# Patient Record
Sex: MALE | Race: WHITE | Employment: FULL TIME | URBAN - METROPOLITAN AREA
[De-identification: names, ages, dates, MRNs, and addresses within clinical notes are randomized per-mention and may not be internally consistent; named-entity substitution may affect disease eponyms.]

---

## 2017-02-04 ENCOUNTER — HOSPITAL ENCOUNTER (OUTPATIENT)
Facility: HOSPITAL | Age: 57
Setting detail: OBSERVATION
Discharge: HOME | End: 2017-02-05
Attending: EMERGENCY MEDICINE | Admitting: HOSPITALIST
Payer: COMMERCIAL

## 2017-02-04 ENCOUNTER — APPOINTMENT (OUTPATIENT)
Dept: CV DIAGNOSTICS | Facility: HOSPITAL | Age: 57
End: 2017-02-04
Attending: INTERNAL MEDICINE
Payer: COMMERCIAL

## 2017-02-04 ENCOUNTER — APPOINTMENT (OUTPATIENT)
Dept: INTERVENTIONAL RADIOLOGY/VASCULAR | Facility: HOSPITAL | Age: 57
End: 2017-02-04
Attending: INTERNAL MEDICINE
Payer: COMMERCIAL

## 2017-02-04 ENCOUNTER — APPOINTMENT (OUTPATIENT)
Dept: GENERAL RADIOLOGY | Facility: HOSPITAL | Age: 57
End: 2017-02-04
Attending: EMERGENCY MEDICINE
Payer: COMMERCIAL

## 2017-02-04 ENCOUNTER — APPOINTMENT (OUTPATIENT)
Dept: CT IMAGING | Facility: HOSPITAL | Age: 57
End: 2017-02-04
Attending: INTERNAL MEDICINE
Payer: COMMERCIAL

## 2017-02-04 DIAGNOSIS — I10 ESSENTIAL HYPERTENSION: ICD-10-CM

## 2017-02-04 DIAGNOSIS — R07.9 ACUTE CHEST PAIN: Primary | ICD-10-CM

## 2017-02-04 LAB
ALBUMIN SERPL-MCNC: 4.3 G/DL (ref 3.5–4.8)
ALP LIVER SERPL-CCNC: 52 U/L (ref 45–117)
ALT SERPL-CCNC: 27 U/L (ref 17–63)
APTT PPP: 29.4 SECONDS (ref 25–34)
AST SERPL-CCNC: 23 U/L (ref 15–41)
ATRIAL RATE: 76 BPM
ATRIAL RATE: 85 BPM
BASOPHILS # BLD AUTO: 0.06 X10(3) UL (ref 0–0.1)
BASOPHILS NFR BLD AUTO: 0.9 %
BILIRUB SERPL-MCNC: 1 MG/DL (ref 0.1–2)
BUN BLD-MCNC: 14 MG/DL (ref 8–20)
CALCIUM BLD-MCNC: 9 MG/DL (ref 8.3–10.3)
CHLORIDE: 106 MMOL/L (ref 101–111)
CO2: 31 MMOL/L (ref 22–32)
CREAT BLD-MCNC: 1.17 MG/DL (ref 0.7–1.3)
EOSINOPHIL # BLD AUTO: 0.13 X10(3) UL (ref 0–0.3)
EOSINOPHIL NFR BLD AUTO: 2 %
ERYTHROCYTE [DISTWIDTH] IN BLOOD BY AUTOMATED COUNT: 12.5 % (ref 11.5–16)
GLUCOSE BLD-MCNC: 117 MG/DL (ref 70–99)
HCT VFR BLD AUTO: 48 % (ref 37–53)
HGB BLD-MCNC: 16.9 G/DL (ref 13–17)
IMMATURE GRANULOCYTE COUNT: 0.03 X10(3) UL (ref 0–1)
IMMATURE GRANULOCYTE RATIO %: 0.5 %
LYMPHOCYTES # BLD AUTO: 2.09 X10(3) UL (ref 0.9–4)
LYMPHOCYTES NFR BLD AUTO: 32.4 %
M PROTEIN MFR SERPL ELPH: 7.4 G/DL (ref 6.1–8.3)
MCH RBC QN AUTO: 30.9 PG (ref 27–33.2)
MCHC RBC AUTO-ENTMCNC: 35.2 G/DL (ref 31–37)
MCV RBC AUTO: 87.8 FL (ref 80–99)
MONOCYTES # BLD AUTO: 0.44 X10(3) UL (ref 0.1–0.6)
MONOCYTES NFR BLD AUTO: 6.8 %
NEUTROPHIL ABS PRELIM: 3.7 X10 (3) UL (ref 1.3–6.7)
NEUTROPHILS # BLD AUTO: 3.7 X10(3) UL (ref 1.3–6.7)
NEUTROPHILS NFR BLD AUTO: 57.4 %
P AXIS: 53 DEGREES
P AXIS: 65 DEGREES
P-R INTERVAL: 160 MS
P-R INTERVAL: 164 MS
PLATELET # BLD AUTO: 273 10(3)UL (ref 150–450)
POTASSIUM SERPL-SCNC: 4 MMOL/L (ref 3.6–5.1)
Q-T INTERVAL: 352 MS
Q-T INTERVAL: 358 MS
QRS DURATION: 88 MS
QRS DURATION: 90 MS
QTC CALCULATION (BEZET): 402 MS
QTC CALCULATION (BEZET): 418 MS
R AXIS: 17 DEGREES
R AXIS: 24 DEGREES
RBC # BLD AUTO: 5.47 X10(6)UL (ref 4.3–5.7)
RED CELL DISTRIBUTION WIDTH-SD: 39.9 FL (ref 35.1–46.3)
SODIUM SERPL-SCNC: 142 MMOL/L (ref 136–144)
T AXIS: 22 DEGREES
T AXIS: 53 DEGREES
TROPONIN: <0.046 NG/ML (ref ?–0.05)
VENTRICULAR RATE: 76 BPM
VENTRICULAR RATE: 85 BPM
WBC # BLD AUTO: 6.5 X10(3) UL (ref 4–13)

## 2017-02-04 PROCEDURE — 4A023N7 MEASUREMENT OF CARDIAC SAMPLING AND PRESSURE, LEFT HEART, PERCUTANEOUS APPROACH: ICD-10-PCS | Performed by: INTERNAL MEDICINE

## 2017-02-04 PROCEDURE — 93010 ELECTROCARDIOGRAM REPORT: CPT

## 2017-02-04 PROCEDURE — 99153 MOD SED SAME PHYS/QHP EA: CPT

## 2017-02-04 PROCEDURE — 96375 TX/PRO/DX INJ NEW DRUG ADDON: CPT

## 2017-02-04 PROCEDURE — 71275 CT ANGIOGRAPHY CHEST: CPT

## 2017-02-04 PROCEDURE — 99291 CRITICAL CARE FIRST HOUR: CPT

## 2017-02-04 PROCEDURE — 84484 ASSAY OF TROPONIN QUANT: CPT | Performed by: EMERGENCY MEDICINE

## 2017-02-04 PROCEDURE — 99285 EMERGENCY DEPT VISIT HI MDM: CPT

## 2017-02-04 PROCEDURE — 84484 ASSAY OF TROPONIN QUANT: CPT | Performed by: INTERNAL MEDICINE

## 2017-02-04 PROCEDURE — 99152 MOD SED SAME PHYS/QHP 5/>YRS: CPT

## 2017-02-04 PROCEDURE — 93005 ELECTROCARDIOGRAM TRACING: CPT

## 2017-02-04 PROCEDURE — 71010 XR CHEST AP PORTABLE  (CPT=71010): CPT

## 2017-02-04 PROCEDURE — 85025 COMPLETE CBC W/AUTO DIFF WBC: CPT | Performed by: EMERGENCY MEDICINE

## 2017-02-04 PROCEDURE — 93458 L HRT ARTERY/VENTRICLE ANGIO: CPT

## 2017-02-04 PROCEDURE — 96368 THER/DIAG CONCURRENT INF: CPT

## 2017-02-04 PROCEDURE — 80053 COMPREHEN METABOLIC PANEL: CPT | Performed by: EMERGENCY MEDICINE

## 2017-02-04 PROCEDURE — 85730 THROMBOPLASTIN TIME PARTIAL: CPT | Performed by: EMERGENCY MEDICINE

## 2017-02-04 PROCEDURE — 96365 THER/PROPH/DIAG IV INF INIT: CPT

## 2017-02-04 PROCEDURE — B2111ZZ FLUOROSCOPY OF MULTIPLE CORONARY ARTERIES USING LOW OSMOLAR CONTRAST: ICD-10-PCS | Performed by: INTERNAL MEDICINE

## 2017-02-04 PROCEDURE — B3101ZZ FLUOROSCOPY OF THORACIC AORTA USING LOW OSMOLAR CONTRAST: ICD-10-PCS | Performed by: INTERNAL MEDICINE

## 2017-02-04 PROCEDURE — 93567 NJX CAR CTH SPRVLV AORTGRPHY: CPT

## 2017-02-04 RX ORDER — NITROGLYCERIN 0.4 MG/1
0.4 TABLET SUBLINGUAL EVERY 5 MIN PRN
Status: DISCONTINUED | OUTPATIENT
Start: 2017-02-04 | End: 2017-02-05

## 2017-02-04 RX ORDER — ASPIRIN 81 MG/1
324 TABLET, CHEWABLE ORAL ONCE
Status: DISCONTINUED | OUTPATIENT
Start: 2017-02-04 | End: 2017-02-05

## 2017-02-04 RX ORDER — MAGNESIUM HYDROXIDE/ALUMINUM HYDROXICE/SIMETHICONE 120; 1200; 1200 MG/30ML; MG/30ML; MG/30ML
30 SUSPENSION ORAL ONCE
Status: COMPLETED | OUTPATIENT
Start: 2017-02-04 | End: 2017-02-04

## 2017-02-04 RX ORDER — HEPARIN SODIUM AND DEXTROSE 10000; 5 [USP'U]/100ML; G/100ML
1000 INJECTION INTRAVENOUS ONCE
Status: COMPLETED | OUTPATIENT
Start: 2017-02-04 | End: 2017-02-04

## 2017-02-04 RX ORDER — HEPARIN SODIUM AND DEXTROSE 10000; 5 [USP'U]/100ML; G/100ML
INJECTION INTRAVENOUS CONTINUOUS
Status: DISCONTINUED | OUTPATIENT
Start: 2017-02-04 | End: 2017-02-04

## 2017-02-04 RX ORDER — LIDOCAINE HYDROCHLORIDE 10 MG/ML
INJECTION, SOLUTION INFILTRATION; PERINEURAL
Status: COMPLETED
Start: 2017-02-04 | End: 2017-02-04

## 2017-02-04 RX ORDER — FAMOTIDINE 20 MG/1
20 TABLET ORAL 2 TIMES DAILY
Status: DISCONTINUED | OUTPATIENT
Start: 2017-02-04 | End: 2017-02-05

## 2017-02-04 RX ORDER — ASPIRIN 81 MG/1
324 TABLET, CHEWABLE ORAL DAILY
Status: DISCONTINUED | OUTPATIENT
Start: 2017-02-04 | End: 2017-02-05

## 2017-02-04 RX ORDER — LISINOPRIL 10 MG/1
10 TABLET ORAL DAILY
Status: DISCONTINUED | OUTPATIENT
Start: 2017-02-04 | End: 2017-02-05

## 2017-02-04 RX ORDER — MIDAZOLAM HYDROCHLORIDE 1 MG/ML
INJECTION INTRAMUSCULAR; INTRAVENOUS
Status: DISCONTINUED
Start: 2017-02-04 | End: 2017-02-04 | Stop reason: WASHOUT

## 2017-02-04 RX ORDER — NITROGLYCERIN 20 MG/100ML
10 INJECTION INTRAVENOUS CONTINUOUS
Status: DISCONTINUED | OUTPATIENT
Start: 2017-02-04 | End: 2017-02-04

## 2017-02-04 RX ORDER — SODIUM CHLORIDE 9 MG/ML
INJECTION, SOLUTION INTRAVENOUS CONTINUOUS
Status: DISCONTINUED | OUTPATIENT
Start: 2017-02-04 | End: 2017-02-05

## 2017-02-04 RX ORDER — ATORVASTATIN CALCIUM 10 MG/1
10 TABLET, FILM COATED ORAL NIGHTLY
Status: DISCONTINUED | OUTPATIENT
Start: 2017-02-04 | End: 2017-02-05

## 2017-02-04 RX ORDER — MIDAZOLAM HYDROCHLORIDE 1 MG/ML
INJECTION INTRAMUSCULAR; INTRAVENOUS
Status: COMPLETED
Start: 2017-02-04 | End: 2017-02-04

## 2017-02-04 RX ORDER — SODIUM CHLORIDE 9 MG/ML
INJECTION, SOLUTION INTRAVENOUS CONTINUOUS
Status: ACTIVE | OUTPATIENT
Start: 2017-02-04 | End: 2017-02-04

## 2017-02-04 RX ORDER — HEPARIN SODIUM 5000 [USP'U]/ML
5000 INJECTION INTRAVENOUS; SUBCUTANEOUS ONCE
Status: COMPLETED | OUTPATIENT
Start: 2017-02-04 | End: 2017-02-04

## 2017-02-04 RX ORDER — ASPIRIN 325 MG
325 TABLET, DELAYED RELEASE (ENTERIC COATED) ORAL DAILY
Status: DISCONTINUED | OUTPATIENT
Start: 2017-02-05 | End: 2017-02-05

## 2017-02-04 RX ORDER — HEPARIN SODIUM 5000 [USP'U]/ML
INJECTION, SOLUTION INTRAVENOUS; SUBCUTANEOUS
Status: COMPLETED
Start: 2017-02-04 | End: 2017-02-04

## 2017-02-04 NOTE — ED INITIAL ASSESSMENT (HPI)
Pt aox4. Pt presents to ed from home. Pt c/o left chest pain started at 0630 this am. Pt states was lying in bed & felt burning to left chest. Pt states pain changed when rolled over in bed. Pt c/o heart burn.  Pt denies denise

## 2017-02-04 NOTE — PROCEDURES
[facility]  Angiogram Procedure Note    Laila Becerril Location: Cath Lab    [facility] [id number] MRN [id number]   Admission Date 2/4/2017 Procedure Date 2/4/2017   Attending Physician Mohan Arias MD Procedure Physician Amira Tran MD     Pre-Procedure D chest pain and HTN in the ER, we elected to do an aortogram when the coronaries showed no etiology for the chest pain. 40cc of contrast was injected revealing an aorta that was normal in caliber without dissection or aneurysm.   It tapered normally below t

## 2017-02-04 NOTE — CONSULTS
[facility] Cardiology Consultation    Jaquelin Ledbetter Patient Status:  Emergency    1960 MRN [id number]   Location [address] [facility] Attending Stephane Zavala MD   Hosp Day # 0 PCP Jacinto Stevenson MD     Reason for Consultation:  Unstable an famoTIDine  20 mg Oral BID   • aspirin  324 mg Oral Daily   • ED Initial dose Heparin infusion  1,000 Units/hr Intravenous Once   • lisinopril  10 mg Oral Daily       Continuous Infusions:  • Nitroglycerin in D5W         Social History:   reports that he h angiogram this weekend likely pending course.     Kristen Michelle  2/4/2017  9:34 AM

## 2017-02-04 NOTE — H&P
[facility] hospitalist H+P    PCP; Vicente Quintana MD  CC; pain    HPI 50 yo male presented with left sided chest pain, burning, not radiating, was up to 5/10. He felt slightly lightheaded upon standing up today.   Worse when laying on left side, improved with lay Comment: 2-3 per month    Drug Use: No    Sexual Activity: Yes    Comment:      Other Topics Concern   None on file     Social History Narrative     All:No Known Allergies  Meds;      No current facility-administered medications on file prior to e

## 2017-02-04 NOTE — ED NOTES
Confirmed Heparin bolus and drip dosage with Dr. Sushila Wilkinson. MD confirmed both doses are correct.

## 2017-02-04 NOTE — PROGRESS NOTES
2nd troponin pending. No chest pain. BP well controlled now. Plan for cardiac cath this afternoon. We talked at length about cardiac catheterization and its risks and benefits.   Risks discussed include, but not limited to death, myocardial infarcti

## 2017-02-04 NOTE — ED PROVIDER NOTES
Patient Seen in: [facility] Emergency Department    History   Patient presents with:  Chest Pain Angina (cardiovascular)    Stated Complaint: cp    HPI    Patient complains of left-sided chest pain. Symptoms started after he got out of bed.   He was HISTORY      Comment RT ankle ligament repair    SKIN SURGERY      Comment BCCA MOHS excision, back 5/2009    SKIN SURGERY      Comment BCCA excision 2005    COLONOSCOPY  5/9/2013    Comment Procedure: COLONOSCOPY;  Surgeon: Darin Thompson MD;  Location: [facility].  Heart: Normal S1 and S2, without murmur or rub. Distal pulses are strong and symmetric. Abdomen: Soft, nondistended. Completely nontender, even in the epigastrium. Extremities: Unremarkable. Calves nonswollen, symmetric, nontender.   No pedal e and what sounds like borderline high blood pressure. Lipid panel from about 2 years ago showed normal HDL/LDL ratio    Patient states that at its worst, the discomfort that he experienced was about 5 out of 10.     Patient reports 1 out of 10 discomfort at

## 2017-02-05 ENCOUNTER — APPOINTMENT (OUTPATIENT)
Dept: CV DIAGNOSTICS | Facility: HOSPITAL | Age: 57
End: 2017-02-05
Attending: INTERNAL MEDICINE
Payer: COMMERCIAL

## 2017-02-05 VITALS
HEART RATE: 69 BPM | OXYGEN SATURATION: 95 % | SYSTOLIC BLOOD PRESSURE: 135 MMHG | TEMPERATURE: 98 F | DIASTOLIC BLOOD PRESSURE: 74 MMHG | RESPIRATION RATE: 22 BRPM | BODY MASS INDEX: 29.99 KG/M2 | HEIGHT: 77 IN | WEIGHT: 254 LBS

## 2017-02-05 LAB
BASOPHILS # BLD AUTO: 0.05 X10(3) UL (ref 0–0.1)
BASOPHILS NFR BLD AUTO: 0.8 %
BUN BLD-MCNC: 16 MG/DL (ref 8–20)
CALCIUM BLD-MCNC: 9.2 MG/DL (ref 8.3–10.3)
CHLORIDE: 107 MMOL/L (ref 101–111)
CO2: 29 MMOL/L (ref 22–32)
CREAT BLD-MCNC: 1.05 MG/DL (ref 0.7–1.3)
EOSINOPHIL # BLD AUTO: 0.13 X10(3) UL (ref 0–0.3)
EOSINOPHIL NFR BLD AUTO: 2 %
ERYTHROCYTE [DISTWIDTH] IN BLOOD BY AUTOMATED COUNT: 12.5 % (ref 11.5–16)
GLUCOSE BLD-MCNC: 100 MG/DL (ref 70–99)
HCT VFR BLD AUTO: 45.5 % (ref 37–53)
HGB BLD-MCNC: 15.9 G/DL (ref 13–17)
IMMATURE GRANULOCYTE COUNT: 0.02 X10(3) UL (ref 0–1)
IMMATURE GRANULOCYTE RATIO %: 0.3 %
LYMPHOCYTES # BLD AUTO: 2.03 X10(3) UL (ref 0.9–4)
LYMPHOCYTES NFR BLD AUTO: 31.2 %
MCH RBC QN AUTO: 30.9 PG (ref 27–33.2)
MCHC RBC AUTO-ENTMCNC: 34.9 G/DL (ref 31–37)
MCV RBC AUTO: 88.5 FL (ref 80–99)
MONOCYTES # BLD AUTO: 0.58 X10(3) UL (ref 0.1–0.6)
MONOCYTES NFR BLD AUTO: 8.9 %
NEUTROPHIL ABS PRELIM: 3.7 X10 (3) UL (ref 1.3–6.7)
NEUTROPHILS # BLD AUTO: 3.7 X10(3) UL (ref 1.3–6.7)
NEUTROPHILS NFR BLD AUTO: 56.8 %
PLATELET # BLD AUTO: 243 10(3)UL (ref 150–450)
POTASSIUM SERPL-SCNC: 4.1 MMOL/L (ref 3.6–5.1)
RBC # BLD AUTO: 5.14 X10(6)UL (ref 4.3–5.7)
RED CELL DISTRIBUTION WIDTH-SD: 40.8 FL (ref 35.1–46.3)
SODIUM SERPL-SCNC: 143 MMOL/L (ref 136–144)
WBC # BLD AUTO: 6.5 X10(3) UL (ref 4–13)

## 2017-02-05 PROCEDURE — 85025 COMPLETE CBC W/AUTO DIFF WBC: CPT | Performed by: HOSPITALIST

## 2017-02-05 PROCEDURE — 80048 BASIC METABOLIC PNL TOTAL CA: CPT | Performed by: HOSPITALIST

## 2017-02-05 RX ORDER — ATORVASTATIN CALCIUM 10 MG/1
10 TABLET, FILM COATED ORAL NIGHTLY
Qty: 30 TABLET | Refills: 0 | Status: SHIPPED | OUTPATIENT
Start: 2017-02-05 | End: 2017-02-14

## 2017-02-05 RX ORDER — FAMOTIDINE 20 MG/1
20 TABLET ORAL 2 TIMES DAILY PRN
Qty: 30 TABLET | Refills: 0 | Status: SHIPPED | OUTPATIENT
Start: 2017-02-05 | End: 2017-02-14

## 2017-02-05 RX ORDER — ASPIRIN 81 MG/1
81 TABLET, CHEWABLE ORAL DAILY
Status: DISCONTINUED | OUTPATIENT
Start: 2017-02-05 | End: 2017-02-05

## 2017-02-05 NOTE — PROGRESS NOTES
[facility] Cardiology Progress Note        Darion Carmichael Patient Status:  Observation    1960 MRN [id number]   [facility] 2NE-A Attending Adalid Castañeda MD   Hosp Day # 1 PCP Rolando Palmer MD     Subjective:  No 23   ALB  4.3       Recent Labs   Lab  02/04/17   0755  02/04/17   1310   TROP  <0.046  <0.046  <0.046       No results for input(s): PTP, INR in the last 72 hours. Impression:  1. Chest pain syndrome with EKG changes, relieved with NTG.   Norm

## 2017-02-05 NOTE — PROGRESS NOTES
02/05/17 1343   Clinical Encounter Type   Visited With Patient not available   Continue Visiting No  (discharged)   Shinto Encounters   Spiritual Requests During Visit / Hospitalization [address] Sinclair Patient

## 2017-02-05 NOTE — PLAN OF CARE
Assumed pt care around 1100hrs. Received pt from ED. Admitted for acute chest pain and hypertension. Pt a/o x 4, RA, NSR on tele monitor. Lungs clear bilaterally. Transferred on heparin gtt and nitro gtt per MD order.    Pt c/o heartburn upon admiss

## 2017-02-05 NOTE — PLAN OF CARE
NURSING DISCHARGE NOTE    Discharged Home via Wheelchair. Accompanied by Spouse and Support staff  Belongings Taken by patient/family. Both IV's removed, both IV catheters intact.    Patient verbalized frustration of waiting inpatient for ECHO to be

## 2017-02-05 NOTE — PLAN OF CARE
Assumed patient care around 0700. S/p angiogram on 2/4. Pt a/o x 4, RA, NSR on tele monitor. Lungs clear bilaterally. Right groin dressing removed. Scant amount blood when dressing removed. Gauze and tape placed. Pt to be d/c today if ECHO is okay.

## 2017-02-06 NOTE — PAYOR COMM NOTE
Attending Physician: No att. providers found    Review Type: ADMISSION   Reviewer: Shawn Foss       Date: February 6, 2017 - 2:00 PM  Payor: [facility]  Authorization Number: [id number]/A  Admit date: 2/4/2017  7:47 AM   Admitted from Emergency Dept drip   Nitro drip  Lopressor iv x 1  Iv 50 and 100  omnipaque iv x 2  RESULTS LAST 24HRS:  Labs Reviewed   COMP METABOLIC PANEL (14) - Abnormal; Notable for the following:     Glucose 117 (*)     All other components within normal limits   BASIC METABOLIC ASA  -monitor troponin  -monitor on telemetry  -echo ordered  Hx of basal cell carcinoma skin; follow up with PCP  Pre-Procedure Diagnosis: unstable angina    Post-Procedure Diagnosis: non-cardiac chest pain  Plan: I had a long discussion with the patient

## 2017-02-06 NOTE — DISCHARGE SUMMARY
[facility]  Discharge Summary    [facility] Patient Status:  Observation    1960 MRN [id number]   [facility] 2NE-A Attending No att. providers found   Hosp Day # 1 PCP Mary Gabriel MD     Date of Admission: 2017    Date o pain; patient placed on  heparin drip during admit  -cardiology did angiogram (please see report for details), no high grade coronary lesions  -CT chest without PE  -per cardiology needs ASA and Statin.  Instructed patient to check LFTs while on statin ther this week. Please call Dr. Jacinto Stevenson MD for result of echocardiogram    Notify physician if you experience any of the followin. Fever  2. . persistent Nausea/vomiting  3. severe uncontrolled pain  4. redness, tenderness, or signs of infection (rona

## 2017-02-16 ENCOUNTER — HOSPITAL ENCOUNTER (OUTPATIENT)
Dept: ULTRASOUND IMAGING | Facility: HOSPITAL | Age: 57
Discharge: HOME | End: 2017-02-16
Attending: INTERNAL MEDICINE
Payer: COMMERCIAL

## 2017-02-16 VITALS — BODY MASS INDEX: 29.52 KG/M2 | HEIGHT: 77 IN | WEIGHT: 250 LBS

## 2017-02-21 ENCOUNTER — HOSPITAL ENCOUNTER (OUTPATIENT)
Dept: ULTRASOUND IMAGING | Facility: HOSPITAL | Age: 57
Discharge: HOME | End: 2017-02-21
Attending: INTERNAL MEDICINE
Payer: COMMERCIAL

## 2017-02-21 DIAGNOSIS — Z98.890 S/P CARDIAC CATHETERIZATION: ICD-10-CM

## 2017-02-21 DIAGNOSIS — Z98.890 PERSONAL HISTORY OF SURGERY TO HEART AND GREAT VESSELS, PRESENTING HAZARDS TO HEALTH: ICD-10-CM

## 2017-02-21 DIAGNOSIS — I72.4 PSEUDOANEURYSM OF FEMORAL ARTERY (HCC): ICD-10-CM

## 2017-02-21 DIAGNOSIS — I72.4 ANEURYSM OF ARTERY OF LOWER EXTREMITY (HCC): ICD-10-CM

## 2017-02-21 PROCEDURE — 76942 ECHO GUIDE FOR BIOPSY: CPT

## 2017-02-21 PROCEDURE — 36002 PSEUDOANEURYSM INJECTION TRT: CPT

## (undated) NOTE — IP AVS SNAPSHOT
[facility] [place] [address] [place], [address] ~ [phone]                Discharge Summary   2/4/2017    Sadaf [place]           Admission Information        Provider Department    2/4/2017 Alexandria White MD  2ne-A         [facility] Bring a paper prescription for each of these medications    - Atorvastatin Calcium 10 MG Tabs  - famoTIDine 20 MG Tabs              Patient Instructions       Activity and wound care after cardiac catherization per cardiology    Please seek medical attent from your insurance company.   Your physician or the clinic staff will work with your insurance company to obtain this authorization for your ordered radiology test.    To schedule an appointment for your radiology test please call Ronny MARIN 1.0 (02/04/17)  7.4 (02/04/17)  4.3 (02/05/17)  143 (02/05/17)  4.1 (02/05/17)  107      Radiology Exams     None      Patient Belongings       Most Recent Value    All belongings returned to patient at discharge Pt's bedside belongings    Medications Sent As your caregivers, we want you to be aware of the medications you are prescribed to take and their potential SIDE EFFECTS. Your nurse will review your medications with you before you are discharged, and can provide you with additional printed information.

## (undated) NOTE — LETTER
Emily Tam [address] [facility] [address] [facility]    Consent for Operation  Date: 2/4/2017                                Time: 1:30 pm    1.  I authorize the performance upon Heath Sanford the following operation:  cardiac catheterization, le performed, including appropriate portions of my body for medical, scientific, or educational purposes, provided my identity is not revealed by the pictures or by descriptive texts accompanying them.  If the procedure has been videotaped, the surgeon will ob Resuscitate order. I CERTIFY THAT I HAVE READ AND UNDERSTAND THE ABOVE CONSENT TO OPERATION, THAT MY DOCTOR PROVIDED ME WITH THE ABOVE EXPLANATIONS, THAT ALL BLANKS OR STATEMENTS REQUIRING INSERTION OR COMPLETION WERE FILLED IN.     Signature of Patient: